# Patient Record
Sex: FEMALE | Race: WHITE | HISPANIC OR LATINO | Employment: PART TIME | URBAN - METROPOLITAN AREA
[De-identification: names, ages, dates, MRNs, and addresses within clinical notes are randomized per-mention and may not be internally consistent; named-entity substitution may affect disease eponyms.]

---
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
## 2019-03-27 ENCOUNTER — ANNUAL EXAM (OUTPATIENT)
Dept: OBGYN CLINIC | Facility: CLINIC | Age: 19
End: 2019-03-27
Payer: COMMERCIAL

## 2019-03-27 VITALS
SYSTOLIC BLOOD PRESSURE: 96 MMHG | BODY MASS INDEX: 28.61 KG/M2 | HEIGHT: 66 IN | WEIGHT: 178 LBS | DIASTOLIC BLOOD PRESSURE: 62 MMHG

## 2019-03-27 DIAGNOSIS — N92.1 MENORRHAGIA WITH IRREGULAR CYCLE: ICD-10-CM

## 2019-03-27 DIAGNOSIS — Z01.419 ENCOUNTER FOR ANNUAL ROUTINE GYNECOLOGICAL EXAMINATION: Primary | ICD-10-CM

## 2019-03-27 DIAGNOSIS — E66.3 OVERWEIGHT (BMI 25.0-29.9): ICD-10-CM

## 2019-03-27 DIAGNOSIS — N94.6 DYSMENORRHEA: ICD-10-CM

## 2019-03-27 DIAGNOSIS — N92.6 IRREGULAR MENSES: ICD-10-CM

## 2019-03-27 DIAGNOSIS — Z72.3 INADEQUATE EXERCISE: ICD-10-CM

## 2019-03-27 PROBLEM — J45.20 MILD INTERMITTENT ASTHMA WITHOUT COMPLICATION: Status: ACTIVE | Noted: 2018-09-13

## 2019-03-27 PROCEDURE — 99395 PREV VISIT EST AGE 18-39: CPT | Performed by: OBSTETRICS & GYNECOLOGY

## 2019-03-27 RX ORDER — ALPRAZOLAM 0.5 MG/1
TABLET ORAL
COMMUNITY
Start: 2019-02-25 | End: 2020-07-09

## 2019-03-27 RX ORDER — TRAZODONE HYDROCHLORIDE 50 MG/1
TABLET ORAL
COMMUNITY
Start: 2019-02-25 | End: 2020-07-09

## 2019-03-27 RX ORDER — ACETAMINOPHEN 160 MG
TABLET,DISINTEGRATING ORAL
COMMUNITY
Start: 2019-03-15

## 2019-03-27 NOTE — PROGRESS NOTES
Pt is a 22 y o  Peggi Felicity with Patient's last menstrual period was 2019 (exact date)  using abstinence and transdermal patch for [facility] presents for preventive care  She notes the n/a partner since her last STI evaluation  In her lifetime she has been involved with 0 partners   Safe sexual practices (monogomy, condoms) are followed consistently  · She does feel safe in the relationship  She does feel safe in her home  · Her calcium intake encompasses multivitamin, milk (cow, goat, almond, cashew, soy, etc), cheese and yogurt for a total of 4 servings daily on average  She does take additional Vitamin D (MVI or supplement)  · She exercises 5 (for 15 minutes) times per week  · Her menses occur every 28  Days, last 4-5 days and require regular pad every 6-7 hours  Menstrual History:  OB History        0    Para   0    Term   0       0    AB   0    Living   0       SAB   0    TAB   0    Ectopic   0    Multiple   0    Live Births   0           Obstetric Comments   Menarche: 12    Menses: 4-5 days  Uses pads, changes every 6 hours  Menarche age: 14  Patient's last menstrual period was 2019 (exact date)  ·      · Pap: not indicated  · STI testing: not indicated  · tobacco use : does not use tobacco              · Colonoscopy: not indicated  ·     Past Medical History:   Diagnosis Date    Asthma     only seasonal last assessed 2015    Irregular periods     Lactose intolerance     Seasonal allergies        Past Surgical History:   Procedure Laterality Date    TONSILLECTOMY AND ADENOIDECTOMY      [facility]       OB History    Para Term  AB Living   0 0 0 0 0 0   SAB TAB Ectopic Multiple Live Births   0 0 0 0 0   Obstetric Comments   Menarche: 12      Menses: 4-5 days  Uses pads, changes every 6 hours         Gyn HX:  dysmenorrhea and irregular menses       Current Outpatient Medications:     ALPRAZolam (XANAX) 0 5 mg tablet, , Disp: , Rfl:     Cholecalciferol (VITAMIN D3) 2000 units capsule, , Disp: , Rfl:     EPINEPHrine (EPIPEN) 0 3 mg/0 3 mL SOAJ, Inject as directed, Disp: , Rfl:     norelgestromin-ethinyl estradiol (ORTHO EVRA) 150-35 MCG/24HR, Place 1 patch on the skin once a week, Disp: 3 patch, Rfl: 12    sertraline (ZOLOFT) 50 mg tablet, , Disp: , Rfl:     traZODone (DESYREL) 50 mg tablet, , Disp: , Rfl:     Allergies   Allergen Reactions    Amoxicillin Hives    Shellfish Allergy     Latex Itching and Rash       Social History     Socioeconomic History    Marital status: Single     Spouse name: None    Number of children: 0    Years of education: 15    Highest education level: Some college, no degree   Occupational History    Occupation: student    Social Needs    Financial resource strain: None    Food insecurity:     Worry: None     Inability: None    Transportation needs:     Medical: None     Non-medical: None   Tobacco Use    Smoking status: Never Smoker    Smokeless tobacco: Never Used   Substance and Sexual Activity    Alcohol use: No     Frequency: Never     Binge frequency: Never    Drug use: No    Sexual activity: Never     Birth control/protection: Patch, Abstinence   Lifestyle    Physical activity:     Days per week: None     Minutes per session: None    Stress: None   Relationships    Social connections:     Talks on phone: None     Gets together: None     Attends Latter day service: None     Active member of club or organization: None     Attends meetings of clubs or organizations: None     Relationship status: None    Intimate partner violence:     Fear of current or ex partner: None     Emotionally abused: None     Physically abused: None     Forced sexual activity: None   Other Topics Concern    None   Social History Narrative    Religious: no preference    Accepts blood products    Calcium-- daily 1 milk, 1 cheese/day and 1 yogurt/day  Takes multivitamin       Lives with mother (single parent)    Exercise: daily yoga 15 minutes  Family History   Problem Relation Age of Onset    Asthma Mother     Allergies Mother         seasonal    No Known Problems Father     Allergies Family     Cancer Family     Diabetes Family     Hypertension Family     Diabetes Maternal Grandfather     No Known Problems Maternal Grandmother     No Known Problems Paternal Grandmother     No Known Problems Paternal Grandfather     Breast cancer Other     Colon cancer Neg Hx     Ovarian cancer Neg Hx        Blood pressure 96/62, height 5' 6" (1 676 m), weight 80 7 kg (178 lb), last menstrual period 02/25/2019, not currently breastfeeding  and Body mass index is 28 73 kg/m²  Physical Exam   Constitutional: She is oriented to person, place, and time  She appears well-developed and well-nourished  HENT:   Head: Normocephalic and atraumatic  Eyes: Conjunctivae and EOM are normal    Neck: Normal range of motion  Neck supple  No tracheal deviation present  No thyromegaly present  Cardiovascular: Normal rate, regular rhythm and normal heart sounds  Pulmonary/Chest: Effort normal and breath sounds normal  No stridor  No respiratory distress  She has no wheezes  She has no rales  Abdominal: Soft  Bowel sounds are normal  She exhibits no distension and no mass  There is no tenderness  There is no rebound and no guarding  Musculoskeletal: Normal range of motion  She exhibits no edema or tenderness  Lymphadenopathy:     She has no cervical adenopathy  Neurological: She is alert and oriented to person, place, and time  Skin: Skin is warm  No rash noted  No erythema  Psychiatric: She has a normal mood and affect  Her behavior is normal  Judgment and thought content normal      Breasts: breasts appear normal, no suspicious masses, no skin or nipple changes or axillary nodes, symmetric fibrous changes in both upper outer quadrants      Pt declines pelvic examination as she is not sexually active  A/P:  Pt is a 22 y o  Peggi Felicity with      Diagnoses and all orders for this visit:    Encounter for annual routine gynecological examination    Irregular menses  -     norelgestromin-ethinyl estradiol (ORTHO EVRA) 150-35 MCG/24HR; Place 1 patch on the skin once a week    Menorrhagia with irregular cycle  -     norelgestromin-ethinyl estradiol (ORTHO EVRA) 150-35 MCG/24HR; Place 1 patch on the skin once a week    Dysmenorrhea  -     norelgestromin-ethinyl estradiol (ORTHO EVRA) 150-35 MCG/24HR; Place 1 patch on the skin once a week    Overweight (BMI 25 0-29  9)    Inadequate exercise    Patient advised recommendation of daily dietary calcium of  1000 mg calcium  Patient advised recommendation of exercise 5 times per week for 30 minutes  Patient advised recommendation of BMI to be between 19-25

## 2020-05-01 ENCOUNTER — TELEPHONE (OUTPATIENT)
Dept: OBGYN CLINIC | Facility: CLINIC | Age: 20
End: 2020-05-01

## 2020-05-01 DIAGNOSIS — N92.1 MENORRHAGIA WITH IRREGULAR CYCLE: ICD-10-CM

## 2020-05-01 DIAGNOSIS — N94.6 DYSMENORRHEA: ICD-10-CM

## 2020-05-01 DIAGNOSIS — N92.6 IRREGULAR MENSES: ICD-10-CM

## 2020-07-09 ENCOUNTER — ANNUAL EXAM (OUTPATIENT)
Dept: OBGYN CLINIC | Facility: CLINIC | Age: 20
End: 2020-07-09
Payer: COMMERCIAL

## 2020-07-09 VITALS
DIASTOLIC BLOOD PRESSURE: 74 MMHG | WEIGHT: 167 LBS | HEIGHT: 66 IN | TEMPERATURE: 97.3 F | SYSTOLIC BLOOD PRESSURE: 100 MMHG | BODY MASS INDEX: 26.84 KG/M2 | HEART RATE: 64 BPM

## 2020-07-09 DIAGNOSIS — N92.1 MENORRHAGIA WITH IRREGULAR CYCLE: ICD-10-CM

## 2020-07-09 DIAGNOSIS — Z20.2 POSSIBLE EXPOSURE TO STD: ICD-10-CM

## 2020-07-09 DIAGNOSIS — N94.6 DYSMENORRHEA: ICD-10-CM

## 2020-07-09 DIAGNOSIS — N92.6 IRREGULAR MENSES: ICD-10-CM

## 2020-07-09 DIAGNOSIS — Z01.419 ENCOUNTER FOR ANNUAL ROUTINE GYNECOLOGICAL EXAMINATION: Primary | ICD-10-CM

## 2020-07-09 DIAGNOSIS — Z72.3 INADEQUATE EXERCISE: ICD-10-CM

## 2020-07-09 PROCEDURE — 99395 PREV VISIT EST AGE 18-39: CPT | Performed by: OBSTETRICS & GYNECOLOGY

## 2020-07-09 RX ORDER — MULTIVITAMIN
1 CAPSULE ORAL DAILY
COMMUNITY

## 2020-07-09 RX ORDER — ALBUTEROL SULFATE 90 UG/1
2 AEROSOL, METERED RESPIRATORY (INHALATION) EVERY 6 HOURS PRN
COMMUNITY
Start: 2017-11-20

## 2020-07-09 NOTE — PROGRESS NOTES
Pt is a 20 y o  [place] with Patient's last menstrual period was 2020 (exact date)  using transdermal patch and condoms (male) for [facility] presents for preventive care  She notes the same partner since her last STI evaluation  In her lifetime she has been involved with 1 partner   Safe sexual practices (monogomy, condoms) are followed consistently  · She does  feel safe in the relationship  She does feel safe in her home  · Her calcium intake encompasses multivitamin, milk (cow, goat, almond, cashew, soy, etc), cheese and yogurt for a total of 5-6 servings daily on average  She does take additional Vitamin D (MVI or supplement)  · She exercises 0 times per week  · Her menses occur every 28 Days, last 4-5 days and require regular tampons every 5-6 hours  Menstrual History:  OB History        0    Para   0    Term   0       0    AB   0    Living   0       SAB   0    TAB   0    Ectopic   0    Multiple   0    Live Births   0           Obstetric Comments   Menarche: 12    Menses: 28/4-5 days/regular tampon changes every 6 hours  Menarche age: 14  Patient's last menstrual period was 2020 (exact date)  ·      · She has completed the HPV vaccine series appropriate for age  · tobacco use : does not use tobacco              · Colonoscopy  mammogram/pap: not indicated  · Agreeable to ch/shivam screening    Past Medical History:   Diagnosis Date    Asthma     only seasonal last assessed 2015    Herpes     Irregular periods     Lactose intolerance     Need for HPV vaccination     completed series    Seasonal allergies        Past Surgical History:   Procedure Laterality Date    TONSILLECTOMY AND ADENOIDECTOMY      [facility]       OB History    Para Term  AB Living   0 0 0 0 0 0   SAB TAB Ectopic Multiple Live Births   0 0 0 0 0   Obstetric Comments   Menarche: 12      Menses: 28/4-5 days/regular tampon changes every 6 hours  Current Outpatient Medications:     albuterol (PROVENTIL HFA,VENTOLIN HFA) 90 mcg/act inhaler, Inhale 2 puffs every 6 (six) hours as needed, Disp: , Rfl:     Cholecalciferol (VITAMIN D3) 2000 units capsule, , Disp: , Rfl:     EPINEPHrine (EPIPEN) 0 3 mg/0 3 mL SOAJ, Inject as directed, Disp: , Rfl:     Multiple Vitamin (MULTIVITAMIN) capsule, Take 1 capsule by mouth daily, Disp: , Rfl:     Multiple Vitamins-Minerals (HAIR SKIN & NAILS ADVANCED PO), Take by mouth, Disp: , Rfl:     norelgestromin-ethinyl estradiol (ORTHO EVRA) 150-35 MCG/24HR, Place 1 patch on the skin once a week, Disp: 3 patch, Rfl: 12    Allergies   Allergen Reactions    Amoxicillin Hives    Shellfish Allergy Hives    Latex Itching and Rash       Social History     Socioeconomic History    Marital status: Single     Spouse name: None    Number of children: 0    Years of education: 15    Highest education level: Some college, no degree   Occupational History    Occupation: student    Social Needs    Financial resource strain: None    Food insecurity:     Worry: None     Inability: None    Transportation needs:     Medical: None     Non-medical: None   Tobacco Use    Smoking status: Never Smoker    Smokeless tobacco: Never Used   Substance and Sexual Activity    Alcohol use: No     Frequency: Never     Binge frequency: Never    Drug use: No    Sexual activity: Yes     Partners: Male     Birth control/protection: Patch, Condom     Comment: Lifetime partners: 1   Lifestyle    Physical activity:     Days per week: None     Minutes per session: None    Stress: None   Relationships    Social connections:     Talks on phone: None     Gets together: None     Attends Spiritism service: None     Active member of club or organization: None     Attends meetings of clubs or organizations: None     Relationship status: None    Intimate partner violence:     Fear of current or ex partner: None     Emotionally abused: None Physically abused: None     Forced sexual activity: None   Other Topics Concern    None   Social History Narrative    Scientology: no preference    Accepts blood products    Calcium-- daily 1 c almond milk, 1 cheese/day and 1 yogurt/day  Takes multivitamin  Lives with mother (single parent)    Exercise: none                · Do you currently or have you served in the [employer]:   No      · Were you activated, into active duty, as a member of the [employer] or as a Reservist:   No        Family History   Problem Relation Age of Onset    Asthma Mother     Allergies Mother         seasonal    No Known Problems Father     Allergies Family     Diabetes Family     Hypertension Family     Diabetes Maternal Grandfather     No Known Problems Maternal Grandmother     No Known Problems Paternal Grandmother         unknown, never met them    No Known Problems Paternal Grandfather         unknown, never met them    Breast cancer Other     Cancer Family     Heart disease Brother         congenital     Colon cancer Neg Hx     Ovarian cancer Neg Hx        Blood pressure 100/74, pulse 64, temperature (!) 97 3 °F (36 3 °C), temperature source Tympanic, height 5' 6" (1 676 m), weight 75 8 kg (167 lb), last menstrual period 06/28/2020, not currently breastfeeding  and Body mass index is 26 95 kg/m²  Physical Exam   Constitutional: She is oriented to person, place, and time  She appears well-developed and well-nourished  HENT:   Head: Normocephalic and atraumatic  Eyes: Conjunctivae and EOM are normal    Neck: Normal range of motion  Neck supple  No tracheal deviation present  No thyromegaly present  Cardiovascular: Normal rate, regular rhythm and normal heart sounds  Pulmonary/Chest: Effort normal and breath sounds normal  No stridor  No respiratory distress  She has no wheezes  She has no rales  Abdominal: Soft  Bowel sounds are normal  She exhibits no distension and no mass   There is no tenderness  There is no rebound and no guarding  Musculoskeletal: Normal range of motion  She exhibits no edema or tenderness  Lymphadenopathy:     She has no cervical adenopathy  Neurological: She is alert and oriented to person, place, and time  Skin: Skin is warm  No rash noted  No erythema  Psychiatric: She has a normal mood and affect  Her behavior is normal  Judgment and thought content normal        Breasts: breasts appear normal, no suspicious masses, no skin or nipple changes or axillary nodes  vulva: normal external genitalia for age and no lesions, masses, epithelial changes, or exudate  vagina: color pink and rugae  well formed rugae  cervix: nullip, no lesions  and cultures obtained  uterus: NSSC,RF, NT, mobile  adnexa: no masses or tenderness      A/P:  Pt is a 20 y o  Ja Poly with      Diagnoses and all orders for this visit:    Encounter for annual routine gynecological examination  -pap not indicated    Possible exposure to STD  -     Ct, Ng, Trich vag by ARIANNA    Irregular menses  -     norelgestromin-ethinyl estradiol (ORTHO EVRA) 150-35 MCG/24HR; Place 1 patch on the skin once a week    Menorrhagia with irregular cycle  -     norelgestromin-ethinyl estradiol (ORTHO EVRA) 150-35 MCG/24HR; Place 1 patch on the skin once a week    Dysmenorrhea  -     norelgestromin-ethinyl estradiol (ORTHO EVRA) 150-35 MCG/24HR; Place 1 patch on the skin once a week    Inadequate exercise    Patient advised recommendation of exercise 5 times per week for 30 minutes  BMI 26 0-26 9,adult  Patient advised recommendation of BMI to be between 19-25

## 2020-07-10 LAB
C TRACH RRNA SPEC QL NAA+PROBE: NOT DETECTED
N GONORRHOEA RRNA SPEC QL NAA+PROBE: NOT DETECTED
T VAGINALIS RRNA SPEC QL NAA+PROBE: NOT DETECTED

## 2020-08-06 ENCOUNTER — TELEPHONE (OUTPATIENT)
Dept: OBGYN CLINIC | Facility: CLINIC | Age: 20
End: 2020-08-06

## 2020-08-06 NOTE — TELEPHONE ENCOUNTER
Patient LM on nurse line, She states that she had started the patch late because she ran out and had to come in for her yearly  She spotted for 1 day it stopped then came down very heavy, she has been bleeding for a week and a half  It is so heavy it goes through the pad and on her clothes  Please advise      Jeronimo Leyva MA

## 2020-08-07 NOTE — TELEPHONE ENCOUNTER
If the patient is bleeding that heavily she should be evaluated  If she cannot find a time that is mutual for her and with availability at the office, she may go to urgent care or the ED

## 2020-08-07 NOTE — TELEPHONE ENCOUNTER
Spoke to patient, she originally scheduled to see Dr Ginger Zamora today 8/7/2020 however ended up having to reschedule as she could not get here on time  Patient scheduled appointment on 8/10/2020

## 2020-08-10 ENCOUNTER — OFFICE VISIT (OUTPATIENT)
Dept: OBGYN CLINIC | Facility: CLINIC | Age: 20
End: 2020-08-10
Payer: COMMERCIAL

## 2020-08-10 VITALS
WEIGHT: 168 LBS | DIASTOLIC BLOOD PRESSURE: 70 MMHG | BODY MASS INDEX: 27.12 KG/M2 | TEMPERATURE: 97.8 F | SYSTOLIC BLOOD PRESSURE: 118 MMHG

## 2020-08-10 DIAGNOSIS — N93.8 DUB (DYSFUNCTIONAL UTERINE BLEEDING): Primary | ICD-10-CM

## 2020-08-10 LAB — SL AMB POCT URINE HCG: NORMAL

## 2020-08-10 PROCEDURE — 81025 URINE PREGNANCY TEST: CPT | Performed by: NURSE PRACTITIONER

## 2020-08-10 PROCEDURE — 99213 OFFICE O/P EST LOW 20 MIN: CPT | Performed by: NURSE PRACTITIONER

## 2020-08-10 NOTE — PROGRESS NOTES
Assessment/Plan:    1  DUB (dysfunctional uterine bleeding)  UPT negative  After just resuming her Ortho Evra contraceptive patch two weeks late  Reassured that the bleeding is due to having started her patches late  She will be due to initiate first patch this Sunday  Advised to continue weekly patches and to let us know if the BTB continues  Subjective:      Patient ID: Johnson Johnson is a 20 y o  female  HPI  PROBLEM VISIT  CC: abnormal menstrual bleeding    last seen 7/9/20 for her yearly visit w/ SB  At the time of her yearly she had run out of the ortho evra patch and was off of it for two weeks  Resumed the patch on 7/19/20, took it for  three weeks, now on 4th week with no patch  Reports that she bled the whole time, starting with spotting to full flow  Spotting now  Last sexually active in June 7/9/20 g/c and trich negative    The following portions of the patient's history were reviewed and updated as appropriate: allergies, current medications, past family history, past medical history, past social history, past surgical history and problem list     Review of Systems   Constitutional: Negative for chills and fever  Respiratory: Negative for cough and shortness of breath  Genitourinary: Positive for menstrual problem and vaginal bleeding  Negative for dysuria, frequency, genital sores, pelvic pain, urgency and vaginal discharge  Musculoskeletal: Negative for arthralgias and myalgias  Objective:    /70 (BP Location: Left arm, Patient Position: Sitting, Cuff Size: Standard)   Temp 97 8 °F (36 6 °C) (Tympanic)   Wt 76 2 kg (168 lb)   LMP 08/10/2020   BMI 27 12 kg/m²      Physical Exam  Constitutional:       Appearance: Normal appearance  She is well-developed  She is not ill-appearing or diaphoretic  HENT:      Head: Normocephalic and atraumatic     Pulmonary:      Effort: Pulmonary effort is normal    Genitourinary:     Labia:         Right: No rash, tenderness, lesion or injury  Left: No rash, tenderness, lesion or injury  Vagina: No signs of injury and foreign body  No vaginal discharge, erythema, tenderness or bleeding  Cervix: No cervical motion tenderness, discharge or friability  Uterus: Not enlarged and not tender  Adnexa:         Right: No mass or tenderness  Left: No mass or tenderness  Skin:     General: Skin is warm and dry  Neurological:      General: No focal deficit present  Mental Status: She is alert and oriented to person, place, and time  Psychiatric:         Mood and Affect: Mood normal          Behavior: Behavior normal          Thought Content:  Thought content normal          Judgment: Judgment normal

## 2020-10-14 ENCOUNTER — OFFICE VISIT (OUTPATIENT)
Dept: OBGYN CLINIC | Facility: CLINIC | Age: 20
End: 2020-10-14
Payer: COMMERCIAL

## 2020-10-14 VITALS
SYSTOLIC BLOOD PRESSURE: 104 MMHG | OXYGEN SATURATION: 98 % | HEART RATE: 67 BPM | DIASTOLIC BLOOD PRESSURE: 84 MMHG | TEMPERATURE: 97.5 F | BODY MASS INDEX: 26.89 KG/M2 | WEIGHT: 166.6 LBS

## 2020-10-14 DIAGNOSIS — Z30.09 CONTRACEPTIVE EDUCATION: Primary | ICD-10-CM

## 2020-10-14 PROCEDURE — 99213 OFFICE O/P EST LOW 20 MIN: CPT | Performed by: OBSTETRICS & GYNECOLOGY

## 2020-10-14 RX ORDER — ETONOGESTREL AND ETHINYL ESTRADIOL 11.7; 2.7 MG/1; MG/1
INSERT, EXTENDED RELEASE VAGINAL
Qty: 1 EACH | Refills: 2 | Status: SHIPPED | OUTPATIENT
Start: 2020-10-14 | End: 2021-01-19 | Stop reason: SDUPTHER

## 2021-01-19 ENCOUNTER — OFFICE VISIT (OUTPATIENT)
Dept: OBGYN CLINIC | Facility: CLINIC | Age: 21
End: 2021-01-19

## 2021-01-19 VITALS
WEIGHT: 169.2 LBS | HEIGHT: 66 IN | SYSTOLIC BLOOD PRESSURE: 100 MMHG | DIASTOLIC BLOOD PRESSURE: 60 MMHG | BODY MASS INDEX: 27.19 KG/M2

## 2021-01-19 DIAGNOSIS — Z30.44 ENCOUNTER FOR SURVEILLANCE OF VAGINAL RING HORMONAL CONTRACEPTIVE DEVICE: Primary | ICD-10-CM

## 2021-01-19 PROCEDURE — 99213 OFFICE O/P EST LOW 20 MIN: CPT | Performed by: OBSTETRICS & GYNECOLOGY

## 2021-01-19 RX ORDER — ETONOGESTREL AND ETHINYL ESTRADIOL 11.7; 2.7 MG/1; MG/1
INSERT, EXTENDED RELEASE VAGINAL
Qty: 1 EACH | Refills: 5 | Status: SHIPPED | OUTPATIENT
Start: 2021-01-19 | End: 2021-08-31

## 2021-01-19 NOTE — PROGRESS NOTES
Patient is a 23 y o  Edelmira Ours with Patient's last menstrual period was 2021  who presents in follow up to changing contraception from ortho evra to nuva ring due to allergic reaction/skin discoloration to the patch  Pt reports she is happy with the nuva ring  She has difficulty removing it, but her partner removes it for her and they are both ok with that  She reports her cycles are every 28 days, lasting 7 days, but 4 of them are spotting  For the 3-4 days she has bleeding she requires a regular tampin every 3-4 hours  She would like to continue her current contraceptive method  Past Medical History:   Diagnosis Date    Asthma     only seasonal last assessed 2015    Herpes     Irregular periods     Lactose intolerance     Need for HPV vaccination     completed series    Seasonal allergies        Past Surgical History:   Procedure Laterality Date    TONSILLECTOMY AND ADENOIDECTOMY      [facility]       OB History    Para Term  AB Living   0 0 0 0 0 0   SAB TAB Ectopic Multiple Live Births   0 0 0 0 0   Obstetric Comments   Menarche: 12      Menses: 28/4-5 days/regular tampon changes every 6 hours             Current Outpatient Medications:     albuterol (PROVENTIL HFA,VENTOLIN HFA) 90 mcg/act inhaler, Inhale 2 puffs every 6 (six) hours as needed, Disp: , Rfl:     Cholecalciferol (VITAMIN D3) 2000 units capsule, , Disp: , Rfl:     EPINEPHrine (EPIPEN) 0 3 mg/0 3 mL SOAJ, Inject as directed, Disp: , Rfl:     etonogestrel-ethinyl estradiol (NUVARING) 0 12-0 015 MG/24HR vaginal ring, Insert vaginally and leave in place for 3 consecutive weeks, then remove for 1 week , Disp: 1 each, Rfl: 5    Multiple Vitamin (MULTIVITAMIN) capsule, Take 1 capsule by mouth daily, Disp: , Rfl:     Multiple Vitamins-Minerals (HAIR SKIN & NAILS ADVANCED PO), Take by mouth, Disp: , Rfl:     Allergies   Allergen Reactions    Amoxicillin Hives    Shellfish Allergy Hives    Latex Itching and Rash       Social History     Socioeconomic History    Marital status: Single     Spouse name: Not on file    Number of children: 0    Years of education: 15    Highest education level: Some college, no degree   Occupational History    Occupation: student    Social Needs    Financial resource strain: Not on file    Food insecurity     Worry: Not on file     Inability: Not on file   [employer] needs     Medical: Not on file     Non-medical: Not on file   Tobacco Use    Smoking status: Never Smoker    Smokeless tobacco: Never Used   Substance and Sexual Activity    Alcohol use: No     Frequency: Never     Binge frequency: Never    Drug use: No    Sexual activity: Yes     Partners: Male     Birth control/protection: Patch, Condom     Comment: Lifetime partners: 1   Lifestyle    Physical activity     Days per week: Not on file     Minutes per session: Not on file    Stress: Not on file   Relationships    Social connections     Talks on phone: Not on file     Gets together: Not on file     Attends Advent service: Not on file     Active member of club or organization: Not on file     Attends meetings of clubs or organizations: Not on file     Relationship status: Not on file    Intimate partner violence     Fear of current or ex partner: Not on file     Emotionally abused: Not on file     Physically abused: Not on file     Forced sexual activity: Not on file   Other Topics Concern    Not on file   Social History Narrative    Mu-ism: no preference    Accepts blood products    Calcium-- daily 1 c almond milk, 1 cheese/day and 1 yogurt/day  Takes multivitamin       Lives with mother (single parent)    Exercise: none                · Do you currently or have you served in the [employer]:   No      · Were you activated, into active duty, as a member of the [employer] or as a Reservist:   No        Family History   Problem Relation Age of Onset    Asthma Mother     Allergies Mother         seasonal    No Known Problems Father     Allergies Family     Diabetes Family     Hypertension Family     Diabetes Maternal Grandfather     No Known Problems Maternal Grandmother     No Known Problems Paternal Grandmother         unknown, never met them    No Known Problems Paternal Grandfather         unknown, never met them    Breast cancer Other     Cancer Family     Heart disease Brother         congenital     Colon cancer Neg Hx     Ovarian cancer Neg Hx        Review of Systems   Constitutional: Negative for chills, fatigue, fever and unexpected weight change  HENT: Negative for congestion, mouth sores and sore throat  Respiratory: Negative for cough, chest tightness, shortness of breath and wheezing  Cardiovascular: Negative for chest pain and palpitations  Gastrointestinal: Negative for abdominal distention, abdominal pain, constipation, diarrhea, nausea and vomiting  Endocrine: Negative for cold intolerance and heat intolerance  Genitourinary: Negative for dyspareunia, dysuria, genital sores, menstrual problem, pelvic pain, vaginal bleeding, vaginal discharge and vaginal pain  Musculoskeletal: Negative for arthralgias  Skin: Negative for color change and rash  Neurological: Negative for dizziness, light-headedness and headaches  Hematological: Negative for adenopathy  Blood pressure 100/60, height 5' 6" (1 676 m), weight 76 7 kg (169 lb 3 2 oz), last menstrual period 01/08/2021, not currently breastfeeding  and Body mass index is 27 31 kg/m²  Physical Exam  Constitutional:       Appearance: Normal appearance  HENT:      Head: Normocephalic and atraumatic  Eyes:      Extraocular Movements: Extraocular movements intact  Conjunctiva/sclera: Conjunctivae normal    Neck:      Musculoskeletal: Normal range of motion  Pulmonary:      Effort: Pulmonary effort is normal    Musculoskeletal: Normal range of motion     Skin:     General: Skin is warm       Findings: No erythema or rash  Neurological:      Mental Status: She is alert and oriented to person, place, and time  Psychiatric:         Mood and Affect: Mood normal          Behavior: Behavior normal          Thought Content: Thought content normal          Judgment: Judgment normal              A/P:  Pt is a 23 y o  Selvin Hodgson with      Diagnoses and all orders for this visit:    Encounter for surveillance of vaginal ring hormonal contraceptive device  -     etonogestrel-ethinyl estradiol (NUVARING) 0 12-0 015 MG/24HR vaginal ring; Insert vaginally and leave in place for 3 consecutive weeks, then remove for 1 week

## 2021-01-20 ENCOUNTER — TELEPHONE (OUTPATIENT)
Dept: PEDIATRICS CLINIC | Facility: CLINIC | Age: 21
End: 2021-01-20

## 2021-02-02 NOTE — TELEPHONE ENCOUNTER
02/02/21 8:44 AM     Thank you for your request  Your request has been received, reviewed, and the patient chart updated  The PCP has successfully been removed with a patient attribution note  This message will now be completed      Thank you  Tosha Marie

## 2021-08-31 ENCOUNTER — OFFICE VISIT (OUTPATIENT)
Dept: OBGYN CLINIC | Facility: CLINIC | Age: 21
End: 2021-08-31
Payer: COMMERCIAL

## 2021-08-31 VITALS
BODY MASS INDEX: 25.88 KG/M2 | HEIGHT: 66 IN | WEIGHT: 161 LBS | DIASTOLIC BLOOD PRESSURE: 60 MMHG | SYSTOLIC BLOOD PRESSURE: 110 MMHG

## 2021-08-31 DIAGNOSIS — Z30.09 CONTRACEPTIVE EDUCATION: Primary | ICD-10-CM

## 2021-08-31 DIAGNOSIS — N92.1 MENORRHAGIA WITH IRREGULAR CYCLE: ICD-10-CM

## 2021-08-31 DIAGNOSIS — N94.6 DYSMENORRHEA: ICD-10-CM

## 2021-08-31 PROCEDURE — 99213 OFFICE O/P EST LOW 20 MIN: CPT | Performed by: OBSTETRICS & GYNECOLOGY

## 2021-08-31 NOTE — PROGRESS NOTES
Patient is a 23 y o  Sami Moon with Patient's last menstrual period was 2021  who presents requesting to discuss changing her contraception  She reports since switching to nuva ring she has noticed she has low libido and moodiness  She wants to switch back to the ortho evra patch  I reminded patient that she had complained of skin irritation/discoloration to the patch  She reports she would prefer that than the side effects of Nuva Ring  She has tried the pill before but kept forgetting to take it so she would like to avoid that as well  Reviewed LN-IUD, Nexplanon, Paragard and Depo provera as alternative options  Pt declines those options for now  Past Medical History:   Diagnosis Date    Asthma     only seasonal last assessed 2015    Herpes     Irregular periods     Lactose intolerance     Need for HPV vaccination     completed series    Seasonal allergies        Past Surgical History:   Procedure Laterality Date    [facility]OM[facility]      [facility]       OB History    Para Term  AB Living   0 0 0 0 0 0   SAB TAB Ectopic Multiple Live Births   0 0 0 0 0   Obstetric Comments   Menarche: 12      Menses: 28/4-5 days/regular tampon changes every 6 hours           Current Outpatient Medications:     albuterol (PROVENTIL HFA,VENTOLIN HFA) 90 mcg/act inhaler, Inhale 2 puffs every 6 (six) hours as needed, Disp: , Rfl:     Cholecalciferol (VITAMIN D3) 2000 units capsule, , Disp: , Rfl:     EPINEPHrine (EPIPEN) 0 3 mg/0 3 mL SOAJ, Inject as directed, Disp: , Rfl:     Multiple Vitamin (MULTIVITAMIN) capsule, Take 1 capsule by mouth daily, Disp: , Rfl:     Multiple Vitamins-Minerals (HAIR SKIN & NAILS ADVANCED PO), Take by mouth, Disp: , Rfl:     norelgestromin-ethinyl estradiol (ORTHO EVRA) 150-35 MCG/24HR, Place 1 patch on the skin once a week Do not wear a patch the fourth week, Disp: 3 patch, Rfl: 3    Allergies   Allergen Reactions    Amoxicillin Hives  Shellfish Allergy - Food Allergy Hives    Latex Itching and Rash       Social History     Socioeconomic History    Marital status: Single     Spouse name: None    Number of children: 0    Years of education: 15    Highest education level: Some college, no degree   Occupational History    Occupation: student    Tobacco Use    Smoking status: Never Smoker    Smokeless tobacco: Never Used   Vaping Use    Vaping Use: Never used   Substance and Sexual Activity    Alcohol use: No    Drug use: No    Sexual activity: Yes     Partners: Male     Birth control/protection: Patch, Condom     Comment: Lifetime partners: 1   Other Topics Concern    None   Social History Narrative    Scientologist: no preference    Accepts blood products    Calcium-- daily 1 c almond milk, 1 cheese/day and 1 yogurt/day  Takes multivitamin  Lives with mother (single parent)    Exercise: none                · Do you currently or have you served in the [employer]:   No      · Were you activated, into active duty, as a member of the [employer] or as a Reservist:   No      Social Determinants of Health     Financial Resource Strain:     Difficulty of Paying Living Expenses:    Food Insecurity:     Worried About [address] in the Last Year:    [address] [facility] in the Last Year:    Transportation Needs:     Lack of Transportation (Medical):      Lack of Transportation (Non-Medical):    Physical Activity:     Days of Exercise per Week:     Minutes of Exercise per Session:    Stress:     Feeling of Stress :    Social Connections:     Frequency of Communication with Friends and Family:     Frequency of Social Gatherings with Friends and Family:     Attends Holiness Services:     Active Member of Clubs or Organizations:     Attends Club or Organization Meetings:     Marital Status:    Intimate Partner Violence:     Fear of Current or Ex-Partner:     Emotionally Abused:     Physically Abused:     Sexually Abused:        Family History   Problem Relation Age of Onset   [de-identified] Asthma Mother     Allergies Mother         seasonal    No Known Problems Father     Allergies Family     Diabetes Family     Hypertension Family     Diabetes Maternal Grandfather     No Known Problems Maternal Grandmother     No Known Problems Paternal Grandmother         unknown, never met them    No Known Problems Paternal Grandfather         unknown, never met them    Breast cancer Other     Cancer Family     Heart disease Brother         congenital     Colon cancer Neg Hx     Ovarian cancer Neg Hx        Review of Systems   Constitutional: Negative for chills, fatigue, fever and unexpected weight change  HENT: Negative for congestion, mouth sores and sore throat  Respiratory: Negative for cough, chest tightness, shortness of breath and wheezing  Cardiovascular: Negative for chest pain and palpitations  Gastrointestinal: Negative for abdominal distention, abdominal pain, constipation, diarrhea, nausea and vomiting  Endocrine: Negative for cold intolerance and heat intolerance  Genitourinary: Negative for dyspareunia, dysuria, genital sores, menstrual problem, pelvic pain, vaginal bleeding, vaginal discharge and vaginal pain  Decreased libido   Musculoskeletal: Negative for arthralgias  Skin: Negative for color change and rash  Neurological: Negative for dizziness, light-headedness and headaches  Hematological: Negative for adenopathy  Psychiatric/Behavioral: Positive for agitation  Blood pressure 110/60, height 5' 6" (1 676 m), weight 73 kg (161 lb), last menstrual period 08/17/2021, not currently breastfeeding  and Body mass index is 25 99 kg/m²  Physical Exam  Constitutional:       General: She is not in acute distress  Appearance: Normal appearance  She is normal weight  She is not ill-appearing  HENT:      Head: Normocephalic and atraumatic     Eyes:      Extraocular Movements: Extraocular movements intact  Conjunctiva/sclera: Conjunctivae normal    Pulmonary:      Effort: Pulmonary effort is normal    Musculoskeletal:         General: Normal range of motion  Cervical back: Normal range of motion  Skin:     General: Skin is warm  Findings: No erythema or rash  Neurological:      Mental Status: She is alert and oriented to person, place, and time  Psychiatric:         Mood and Affect: Mood normal          Behavior: Behavior normal          Thought Content: Thought content normal          Judgment: Judgment normal            A/P:  Pt is a 23 y o  Chelsy Aguillon with      Mar Kee was seen today for contraception and mood swings  Diagnoses and all orders for this visit:    Contraceptive education  -Pt would like to return to ortho evra despite h o skin irritation  Will read more about Lawerence Primer and if rash unbearable will consider    Menorrhagia with irregular cycle  -     norelgestromin-ethinyl estradiol (ORTHO EVRA) 150-35 MCG/24HR; Place 1 patch on the skin once a week Do not wear a patch the fourth week    Dysmenorrhea  -     norelgestromin-ethinyl estradiol (ORTHO EVRA) 150-35 MCG/24HR;  Place 1 patch on the skin once a week Do not wear a patch the fourth week

## 2023-08-31 ENCOUNTER — ANNUAL EXAM (OUTPATIENT)
Dept: OBGYN CLINIC | Facility: CLINIC | Age: 23
End: 2023-08-31
Payer: COMMERCIAL

## 2023-08-31 VITALS
DIASTOLIC BLOOD PRESSURE: 62 MMHG | SYSTOLIC BLOOD PRESSURE: 106 MMHG | HEIGHT: 66 IN | BODY MASS INDEX: 24.91 KG/M2 | WEIGHT: 155 LBS

## 2023-08-31 DIAGNOSIS — N94.6 DYSMENORRHEA: ICD-10-CM

## 2023-08-31 DIAGNOSIS — Z20.2 POSSIBLE EXPOSURE TO STD: ICD-10-CM

## 2023-08-31 DIAGNOSIS — N92.1 MENORRHAGIA WITH IRREGULAR CYCLE: ICD-10-CM

## 2023-08-31 DIAGNOSIS — Z12.4 PAP SMEAR FOR CERVICAL CANCER SCREENING: ICD-10-CM

## 2023-08-31 DIAGNOSIS — Z01.419 ENCOUNTER FOR ANNUAL ROUTINE GYNECOLOGICAL EXAMINATION: Primary | ICD-10-CM

## 2023-08-31 PROBLEM — J30.2 SEASONAL ALLERGIES: Status: ACTIVE | Noted: 2023-04-11

## 2023-08-31 PROCEDURE — 87491 CHLMYD TRACH DNA AMP PROBE: CPT | Performed by: OBSTETRICS & GYNECOLOGY

## 2023-08-31 PROCEDURE — 99395 PREV VISIT EST AGE 18-39: CPT | Performed by: OBSTETRICS & GYNECOLOGY

## 2023-08-31 PROCEDURE — 87591 N.GONORRHOEAE DNA AMP PROB: CPT | Performed by: OBSTETRICS & GYNECOLOGY

## 2023-08-31 PROCEDURE — G0145 SCR C/V CYTO,THINLAYER,RESCR: HCPCS | Performed by: OBSTETRICS & GYNECOLOGY

## 2023-08-31 NOTE — PROGRESS NOTES
Pt is a 20 y.o. Codey Bollard with Patient's last menstrual period was 2023 (approximate). using transdermal patch and condoms (male) for [facility] presents for preventive care. She notes the same partner since her last STI evaluation. In her lifetime she has been involved with 1 partner . Safe sexual practices (monogomy, condoms) are followed consistently. · She does  feel safe in the relationship. She does feel safe in her home. · Her calcium intake encompasses multivitamin , milk (cow, goat, almond, cashew, soy, etc), cheese and yogurt for a total of 4-5 servings daily on average. She does take additional Vitamin D (MVI or supplement). · She exercises 4-5 times per week. · Her menses occur every 28 Days, last 4-5 days and require regular tampons every 5-6 hours. Menstrual History:  OB History        0    Para   0    Term   0       0    AB   0    Living   0       SAB   0    IAB   0    Ectopic   0    Multiple   0    Live Births   0           Obstetric Comments   Menarche: 12    Menses: 28/4-5 days/regular tampon changes every 6 hours. Menarche age: 14  Patient's last menstrual period was 2023 (approximate). Period Cycle (Days): 28  Period Duration (Days): 7  Period Pattern: Regular  Menstrual Flow: Moderate  Menstrual Control: Tampon (Menstraul Panties)  ·      · She has completed the HPV vaccine series appropriate for age.   · tobacco use : does not use tobacco              · Colonoscopy/mammogram: not indicated  · Pap: never had, collected today  · Eloy/ch screening: pt desires screening    Past Medical History:   Diagnosis Date   • Asthma     only seasonal last assessed 2015   • Gastroesophageal reflux disease 2014   • Irregular periods    • Lactose intolerance    • Need for HPV vaccination     completed series   • Pap smear for cervical cancer screening     2023   • Seasonal allergies        Past Surgical History:   Procedure Laterality Date   • [address] [facility]       OB History    Para Term  AB Living   0 0 0 0 0 0   SAB IAB Ectopic Multiple Live Births   0 0 0 0 0   Obstetric Comments   Menarche: 12      Menses: 28/4-5 days/regular tampon changes every 6 hours. Current Outpatient Medications:   •  albuterol (PROVENTIL HFA,VENTOLIN HFA) 90 mcg/act inhaler, Inhale 2 puffs every 6 (six) hours as needed, Disp: , Rfl:   •  Cholecalciferol (VITAMIN D3) 2000 units capsule, , Disp: , Rfl:   •  EPINEPHrine (EPIPEN) 0.3 mg/0.3 mL SOAJ, Inject as directed, Disp: , Rfl:   •  Multiple Vitamin (MULTIVITAMIN) capsule, Take 1 capsule by mouth daily, Disp: , Rfl:   •  Multiple Vitamins-Minerals (HAIR SKIN & NAILS ADVANCED PO), Take by mouth, Disp: , Rfl:   •  norelgestromin-ethinyl estradiol (ORTHO EVRA) 150-35 MCG/24HR, Place 1 patch on the skin over 7 days once a week Do not wear a patch the fourth week, Disp: 3 patch, Rfl: 4    Allergies   Allergen Reactions   • Amoxicillin Hives   • Shellfish Allergy - Food Allergy Hives   • Latex Itching and Rash       Social History     Socioeconomic History   • Marital status: Single     Spouse name: None   • Number of children: 0   • Years of education: 13   • Highest education level: Some college, no degree   Occupational History   • Occupation: student    Tobacco Use   • Smoking status: Never   • Smokeless tobacco: Never   Vaping Use   • Vaping Use: Never used   Substance and Sexual Activity   • Alcohol use: No   • Drug use: No   • Sexual activity: Yes     Partners: Male     Birth control/protection: Patch, Condom     Comment: Lifetime partners: 1   Other Topics Concern   • None   Social History Narrative    [employer]: no preference    Accepts blood products    Calcium--1 c p-protein milk, 1 cheese/day and 1 yogurt/day.  Takes multivitamin daily    Exercise: 5x/week                · Do you currently or have you served in the [address] [place] [address] [employer]:   No      · Were you activated, into active duty, as a member of the  or as a Reservist:   No      Social Determinants of Health     Financial Resource Strain: Not on file   Food Insecurity: Not on file   Transportation Needs: Not on file   Physical Activity: Not on file   Stress: Not on file   Social Connections: Not on file   Intimate Partner Violence: Not on file   Housing Stability: Not on file       Family History   Problem Relation Age of Onset   • Asthma Mother    • Allergies Mother         seasonal   • No Known Problems Father    • Heart disease Brother         congenital    • Heart disease Maternal Grandmother    • Diabetes Maternal Grandfather    • Pancreatic cancer Maternal Grandfather    • No Known Problems Paternal Grandmother         unknown, never met them   • No Known Problems Paternal Grandfather         unknown, never met them   • Breast cancer Other    • Allergies Family    • Diabetes Family    • Hypertension Family    • Cancer Family    • Colon cancer Neg Hx    • Ovarian cancer Neg Hx        Blood pressure 106/62, height 5' 6" (1.676 m), weight 70.3 kg (155 lb), last menstrual period 08/16/2023, not currently breastfeeding. and Body mass index is 25.02 kg/m². Physical Exam  Constitutional:       Appearance: She is well-developed. HENT:      Head: Normocephalic and atraumatic. Eyes:      Conjunctiva/sclera: Conjunctivae normal.   Neck:      Thyroid: No thyromegaly. Trachea: No tracheal deviation. Cardiovascular:      Rate and Rhythm: Normal rate and regular rhythm. Heart sounds: Normal heart sounds. Pulmonary:      Effort: Pulmonary effort is normal. No respiratory distress. Breath sounds: Normal breath sounds. No stridor. No wheezing or rales. Abdominal:      General: Bowel sounds are normal. There is no distension. Palpations: Abdomen is soft. There is no mass. Tenderness: There is no abdominal tenderness. There is no guarding or rebound.       Hernia: No hernia is present. Musculoskeletal:         General: No tenderness. Normal range of motion. Cervical back: Normal range of motion and neck supple. Lymphadenopathy:      Cervical: No cervical adenopathy. Skin:     General: Skin is warm. Findings: No erythema or rash. Neurological:      Mental Status: She is alert and oriented to person, place, and time. Psychiatric:         Mood and Affect: Mood normal.         Behavior: Behavior normal.         Thought Content: Thought content normal.         Judgment: Judgment normal.         Breasts: breasts appear normal, no suspicious masses, no skin or nipple changes or axillary nodes. vulva: normal external genitalia for age and no lesions, masses, epithelial changes, or exudate  vagina: color pink and rugae  well formed rugae  cervix: nullip, no lesions  and pap and cultures obtained  uterus: NSSC, AF, NT, mobile  adnexa: no masses or tenderness      A/P:  Pt is a 20 y.o. Luca Palacios with      Luigiorised Benites was seen today for gynecologic exam.    Diagnoses and all orders for this visit:    Encounter for annual routine gynecological examination  -normal examination  -pap collected today    Menorrhagia with irregular cycle  -     norelgestromin-ethinyl estradiol (ORTHO EVRA) 150-35 MCG/24HR; Place 1 patch on the skin over 7 days once a week Do not wear a patch the fourth week    Dysmenorrhea  -     norelgestromin-ethinyl estradiol (ORTHO EVRA) 150-35 MCG/24HR;  Place 1 patch on the skin over 7 days once a week Do not wear a patch the fourth week    Pap smear for cervical cancer screening  -     Liquid-based pap, screening    Possible exposure to STD  -     Chlamydia/GC amplified DNA by PCR

## 2023-09-02 LAB
C TRACH DNA SPEC QL NAA+PROBE: NEGATIVE
N GONORRHOEA DNA SPEC QL NAA+PROBE: NEGATIVE

## 2023-09-07 LAB
LAB AP GYN PRIMARY INTERPRETATION: NORMAL
Lab: NORMAL

## 2024-02-21 PROBLEM — Z01.419 ENCOUNTER FOR ANNUAL ROUTINE GYNECOLOGICAL EXAMINATION: Status: RESOLVED | Noted: 2018-03-26 | Resolved: 2024-02-21

## 2024-09-03 ENCOUNTER — ANNUAL EXAM (OUTPATIENT)
Dept: OBGYN CLINIC | Facility: CLINIC | Age: 24
End: 2024-09-03
Payer: COMMERCIAL

## 2024-09-03 VITALS
WEIGHT: 162 LBS | DIASTOLIC BLOOD PRESSURE: 68 MMHG | BODY MASS INDEX: 26.03 KG/M2 | SYSTOLIC BLOOD PRESSURE: 110 MMHG | HEIGHT: 66 IN

## 2024-09-03 DIAGNOSIS — Z30.45 ENCOUNTER FOR SURVEILLANCE OF TRANSDERMAL PATCH HORMONAL CONTRACEPTIVE DEVICE: ICD-10-CM

## 2024-09-03 DIAGNOSIS — N94.6 DYSMENORRHEA: ICD-10-CM

## 2024-09-03 DIAGNOSIS — Z01.419 ENCOUNTER FOR ANNUAL ROUTINE GYNECOLOGICAL EXAMINATION: Primary | ICD-10-CM

## 2024-09-03 DIAGNOSIS — N92.1 MENORRHAGIA WITH IRREGULAR CYCLE: ICD-10-CM

## 2024-09-03 PROCEDURE — 99395 PREV VISIT EST AGE 18-39: CPT | Performed by: OBSTETRICS & GYNECOLOGY

## 2024-09-03 RX ORDER — NORELGESTROMIN AND ETHINYL ESTRADIOL 35; 150 UG/MG; UG/MG
1 PATCH TRANSDERMAL WEEKLY
Qty: 3 PATCH | Refills: 12 | Status: SHIPPED | OUTPATIENT
Start: 2024-09-03

## 2024-09-03 NOTE — PROGRESS NOTES
Pt is a 23 y.o.  with Patient's last menstrual period was 2024 (approximate). using condoms and transdermal patch for BC presents for preventive care.   She notes the same partner since her last STI evaluation. In her lifetime she has been involved with 1 partner .   Safe sexual practices (monogomy, condoms) are followed consistently.         She does  feel safe in the relationship.  She does feel safe in her home.    Her calcium intake encompasses  multivitamin, milk (cow, goat, almond, cashew, soy, etc), cheese, and yogurt for a total of 4-5 servings daily on average.  She does take additional Vitamin D (MVI or supplement).    She exercises 4-5 times per week.  Her menses occur every 28 Days, last 4-5 days and require regular tampons every 5-6 hours.  Menstrual History:  OB History          0    Para   0    Term   0       0    AB   0    Living   0         SAB   0    IAB   0    Ectopic   0    Multiple   0    Live Births   0           Obstetric Comments   Menarche: 12    Menses: 28/4-5 days/regular tampon changes every 6 hours.              Menarche age: 12  Patient's last menstrual period was 2024 (approximate).          She has completed the HPV vaccine series appropriate for age.  tobacco use : does not use tobacco              Colonoscopy: not indicated  Mammogram: not indicated  Pap: 2023-wnl, repeat     Past Medical History:   Diagnosis Date    Asthma     only seasonal last assessed 2015    Gastroesophageal reflux disease 2014    Irregular periods     Lactose intolerance     Need for HPV vaccination     completed series    Pap smear for cervical cancer screening     2023-wnl    Seasonal allergies        Past Surgical History:   Procedure Laterality Date    TONSILLECTOMY AND ADENOIDECTOMY      [facility]       OB History    Para Term  AB Living   0 0 0 0 0 0   SAB IAB Ectopic Multiple Live Births   0 0 0 0 0   Obstetric  Comments   Menarche: 12      Menses: 28/4-5 days/regular tampon changes every 6 hours.            Current Outpatient Medications:     albuterol (PROVENTIL HFA,VENTOLIN HFA) 90 mcg/act inhaler, Inhale 2 puffs every 6 (six) hours as needed, Disp: , Rfl:     Cholecalciferol (VITAMIN D3) 2000 units capsule, , Disp: , Rfl:     EPINEPHrine (EPIPEN) 0.3 mg/0.3 mL SOAJ, Inject as directed, Disp: , Rfl:     Multiple Vitamin (MULTIVITAMIN) capsule, Take 1 capsule by mouth daily, Disp: , Rfl:     Multiple Vitamins-Minerals (HAIR SKIN & NAILS ADVANCED PO), Take by mouth, Disp: , Rfl:     norelgestromin-ethinyl estradiol (ORTHO EVRA) 150-35 MCG/24HR, Place 1 patch on the skin over 7 days once a week Do not wear a patch the fourth week, Disp: 3 patch, Rfl: 12    Allergies   Allergen Reactions    Amoxicillin Hives    Shellfish Allergy - Food Allergy Hives    Latex Itching and Rash       Social History     Socioeconomic History    Marital status: Single     Spouse name: None    Number of children: 0    Years of education: 13    Highest education level: Some college, no degree   Occupational History    Occupation: student     Occupation: [employer]   Tobacco Use    Smoking status: Never    Smokeless tobacco: Never   Vaping Use    Vaping status: Never Used   Substance and Sexual Activity    Alcohol use: Not Currently     Alcohol/week: 0.0 - 2.0 standard drinks of alcohol     Comment: socially-once monthly    Drug use: No    Sexual activity: Yes     Partners: Male     Birth control/protection: Patch, Condom     Comment: Lifetime partners: 1   Other Topics Concern    None   Social History Narrative    Roman Catholic: no preference    Accepts blood products    Calcium--1 c p-protein milk daily, 1 cheese/day and 1 yogurt/day. Takes multivitamin daily    Exercise: 5x/week                · Do you currently or have you served in the [employer]:   No      · Were you activated, into active duty, as a member of the [employer] or as a Reservist:   " No      Social Determinants of Health     Financial Resource Strain: Not on file   Food Insecurity: Not on file   Transportation Needs: Not on file   Physical Activity: Not on file   Stress: Not on file   Social Connections: Not on file   Intimate Partner Violence: Not on file   Housing Stability: Not on file       Family History   Problem Relation Age of Onset    Asthma Mother     Allergies Mother         seasonal    No Known Problems Father     Heart disease Brother         congenital     Heart disease Maternal Grandmother     Diabetes Maternal Grandfather     Pancreatic cancer Maternal Grandfather     No Known Problems Paternal Grandmother         unknown, never met them    No Known Problems Paternal Grandfather         unknown, never met them    Breast cancer Other     Allergies Family     Diabetes Family     Hypertension Family     Cancer Family     Colon cancer Neg Hx     Ovarian cancer Neg Hx        Blood pressure 110/68, height 5' 6\" (1.676 m), weight 73.5 kg (162 lb), last menstrual period 08/31/2024, not currently breastfeeding. and Body mass index is 26.15 kg/m².    Physical Exam  Constitutional:       General: She is not in acute distress.     Appearance: Normal appearance. She is well-developed and normal weight. She is not ill-appearing.   HENT:      Head: Normocephalic and atraumatic.   Eyes:      Extraocular Movements: Extraocular movements intact.      Conjunctiva/sclera: Conjunctivae normal.   Neck:      Thyroid: No thyromegaly.      Trachea: No tracheal deviation.   Cardiovascular:      Rate and Rhythm: Normal rate and regular rhythm.      Heart sounds: Normal heart sounds.   Pulmonary:      Effort: Pulmonary effort is normal. No respiratory distress.      Breath sounds: Normal breath sounds. No stridor. No wheezing or rales.   Abdominal:      General: Abdomen is flat. Bowel sounds are normal. There is no distension.      Palpations: Abdomen is soft. There is no mass.      Tenderness: There is no " abdominal tenderness. There is no guarding or rebound.      Hernia: No hernia is present.   Musculoskeletal:         General: No tenderness. Normal range of motion.      Cervical back: Normal range of motion and neck supple.   Lymphadenopathy:      Cervical: No cervical adenopathy.   Skin:     General: Skin is warm.      Findings: No erythema or rash.   Neurological:      Mental Status: She is alert and oriented to person, place, and time.   Psychiatric:         Mood and Affect: Mood normal.         Behavior: Behavior normal.         Thought Content: Thought content normal.         Judgment: Judgment normal.         Breasts: breasts appear normal, no suspicious masses, no skin or nipple changes or axillary nodes, symmetric fibrous changes in both upper outer quadrants.    vulva: normal external genitalia for age and no lesions, masses, epithelial changes, or exudate  vagina: color pink, rugae  well formed rugae, and bleeding  with a small amount of bleeding  cervix: nullip and no lesions   uterus: NSSC, AF, NT, mobile  adnexa: no masses or tenderness      A/P:  Pt is a 23 y.o.  with      Krystle was seen today for gynecologic exam.    Diagnoses and all orders for this visit:    Encounter for annual routine gynecological examination  -stable examination  -pap up to date  -adequate calcium and exercise    Menorrhagia with irregular cycle  -     norelgestromin-ethinyl estradiol (ORTHO EVRA) 150-35 MCG/24HR; Place 1 patch on the skin over 7 days once a week Do not wear a patch the fourth week    Dysmenorrhea  -     norelgestromin-ethinyl estradiol (ORTHO EVRA) 150-35 MCG/24HR; Place 1 patch on the skin over 7 days once a week Do not wear a patch the fourth week    Encounter for surveillance of transdermal patch hormonal contraceptive device  -     norelgestromin-ethinyl estradiol (ORTHO EVRA) 150-35 MCG/24HR; Place 1 patch on the skin over 7 days once a week Do not wear a patch the fourth week

## 2025-07-14 ENCOUNTER — TELEPHONE (OUTPATIENT)
Age: 25
End: 2025-07-14